# Patient Record
Sex: FEMALE | Race: WHITE | Employment: UNEMPLOYED | URBAN - METROPOLITAN AREA
[De-identification: names, ages, dates, MRNs, and addresses within clinical notes are randomized per-mention and may not be internally consistent; named-entity substitution may affect disease eponyms.]

---

## 2018-08-08 ENCOUNTER — OFFICE VISIT (OUTPATIENT)
Dept: FAMILY MEDICINE CLINIC | Age: 63
End: 2018-08-08

## 2018-08-08 VITALS
WEIGHT: 135 LBS | DIASTOLIC BLOOD PRESSURE: 62 MMHG | HEART RATE: 71 BPM | OXYGEN SATURATION: 96 % | RESPIRATION RATE: 16 BRPM | SYSTOLIC BLOOD PRESSURE: 108 MMHG | BODY MASS INDEX: 24.84 KG/M2 | HEIGHT: 62 IN | TEMPERATURE: 98.6 F

## 2018-08-08 DIAGNOSIS — Z12.11 COLON CANCER SCREENING: ICD-10-CM

## 2018-08-08 DIAGNOSIS — Z78.0 ASYMPTOMATIC MENOPAUSAL STATE: ICD-10-CM

## 2018-08-08 DIAGNOSIS — Z12.39 BREAST CANCER SCREENING: ICD-10-CM

## 2018-08-08 DIAGNOSIS — L98.9 SCALP LESION: ICD-10-CM

## 2018-08-08 DIAGNOSIS — M79.641 PAIN IN BOTH HANDS: Primary | ICD-10-CM

## 2018-08-08 DIAGNOSIS — M79.672 PAIN IN BOTH FEET: ICD-10-CM

## 2018-08-08 DIAGNOSIS — Z13.820 SCREENING FOR OSTEOPOROSIS: ICD-10-CM

## 2018-08-08 DIAGNOSIS — M79.642 PAIN IN BOTH HANDS: Primary | ICD-10-CM

## 2018-08-08 DIAGNOSIS — M79.671 PAIN IN BOTH FEET: ICD-10-CM

## 2018-08-08 NOTE — PROGRESS NOTES
Rm:1    Chief Complaint   Patient presents with    Arthritis     pt presents for Plasma paperwork to be completed that states she has arthritis    Hair/Scalp Problem     pt states she has spots in her hair that come and go     Depression Screening:  PHQ over the last two weeks 8/8/2018   Little interest or pleasure in doing things Not at all   Feeling down, depressed, irritable, or hopeless Not at all   Total Score PHQ 2 0       Learning Assessment:  Learning Assessment 8/8/2018   PRIMARY LEARNER Patient   HIGHEST LEVEL OF EDUCATION - PRIMARY LEARNER  GRADUATED HIGH SCHOOL OR GED   PRIMARY LANGUAGE ENGLISH   LEARNER PREFERENCE PRIMARY DEMONSTRATION   ANSWERED BY patient   RELATIONSHIP SELF       Abuse Screening:  No flowsheet data found. Health Maintenance reviewed and discussed per provider: yes     Coordination of Care:    1. Have you been to the ER, urgent care clinic since your last visit? Hospitalized since your last visit? no    2. Have you seen or consulted any other health care providers outside of the [address] [place] [address] since your last visit? Include any pap smears or colon screening.  no

## 2018-08-08 NOTE — PROGRESS NOTES
HISTORY OF PRESENT ILLNESS  Davy Sexton is a 70 y.o. female. HPI Comments: Ms. Lázaro Gunn is here to establish care and to try to get some paperwork filled out. She went to donate plasma recently and put on the form that she had arthritis and osteoporosis. Apparently there are some types of arthritis that disqualify people from donating, so they need to know what it is. She has painful hands, especially the DIP joints. She also has bunion deformities of both feet. She says she had a bone density study 4-5 years ago and she thinks they told her that she had osteoporosis. Finally, she's had scalp lesions on and off for years, and they are often painful. Somebody prescribed one time, she can't remember what it was, but it didn't help. She got Medicare on Jan 1 and is overdue for most  items. I will go ahead and start on them now, and have her schedule a IPPE wellness visit. Arthritis   Pertinent negatives include no chest pain, no abdominal pain, no headaches and no shortness of breath. Hair/Scalp Problem   Pertinent negatives include no chest pain, no abdominal pain, no headaches and no shortness of breath. Review of Systems   Constitutional: Negative for chills and fever. HENT: Negative for hearing loss and sore throat. Eyes: Negative for blurred vision and double vision. Respiratory: Negative for cough and shortness of breath. Cardiovascular: Negative for chest pain and palpitations. Gastrointestinal: Negative for abdominal pain, nausea and vomiting. Genitourinary: Negative for dysuria and urgency. Musculoskeletal: Positive for joint pain (hands and feet). Skin: Positive for rash (scalp). Neurological: Negative for headaches. Physical Exam   Constitutional: Vital signs are normal. She appears well-developed and well-nourished.    HENT:   Right Ear: Tympanic membrane and ear canal normal.   Left Ear: Tympanic membrane and ear canal normal.   Nose: Nose normal. Mouth/Throat: Uvula is midline, oropharynx is clear and moist and mucous membranes are normal.   Eyes: Pupils are equal, round, and reactive to light. Cardiovascular: Normal rate and regular rhythm. Pulmonary/Chest: Effort normal and breath sounds normal. No respiratory distress. She has no wheezes. Musculoskeletal:   Arthritic changes DIPs   Skin: No rash noted. Red lesions in scalp, non-tender. Nursing note and vitals reviewed. ASSESSMENT and PLAN    ICD-10-CM ICD-9-CM    1. Pain in both hands M79.641 729.5 XR HAND LT AP/LAT    M79.642  XR HAND RT AP/LAT   2. Scalp lesion L98.9 709.9 REFERRAL TO DERMATOLOGY   3. Pain in both feet M79.671 729.5     M79.672     4. Screening for osteoporosis Z13.820 V82.81 DEXA BONE DENSITY STUDY AXIAL   5. Asymptomatic menopausal state Z78.0 V49.81 DEXA BONE DENSITY STUDY AXIAL   6. Breast cancer screening Z12.31 V76.10 [facility] MAMMO BI SCREENING INCL CAD   7.  Colon cancer screening Z12.11 V76.51 REFERRAL TO COLON AND RECTAL SURGERY

## 2018-08-08 NOTE — MR AVS SNAPSHOT
[address] [place] [address] 83 [id number] 
[phone] Patient: Donal Arellano MRN: [id number] OTIS:[id number] Visit Information Date & Time Provider Department Dept. Phone Encounter #  
 8/8/2018  8:30 AM Jovanni Hughes  [facility] [phone] [id number] Upcoming Health Maintenance Date Due Hepatitis C Screening 1955 DTaP/Tdap/Td series (1 - Tdap) 1/24/1974 BREAST CANCER SCRN MAMMOGRAM 1/24/2003 FOBT Q 1 YEAR AGE 50-75 1/24/2003 ZOSTER VACCINE AGE 60> 11/24/2012 GLAUCOMA SCREENING Q2Y 1/24/2018 Bone Densitometry (Dexa) Screening 1/24/2018 Pneumococcal 65+ Low/Medium Risk (1 of 2 - PCV13) 1/24/2018 Influenza Age 5 to Adult 8/1/2018 Allergies as of 8/8/2018  Review Complete On: 8/8/2018 By: Jovanni Hughes MD  
 No Known Allergies Current Immunizations  Never Reviewed No immunizations on file. Not reviewed this visit You Were Diagnosed With   
  
 Codes Comments Pain in both hands    -  Primary ICD-10-CM: M79.641, P80.207 ICD-9-CM: 729.5 Scalp lesion     ICD-10-CM: L98.9 ICD-9-CM: 709.9 Pain in both feet     ICD-10-CM: M79.671, Y81.678 ICD-9-CM: 729.5 Screening for osteoporosis     ICD-10-CM: Z13.820 ICD-9-CM: V82.81 Asymptomatic menopausal state     ICD-10-CM: Z78.0 ICD-9-CM: V49.81 Breast cancer screening     ICD-10-CM: Z12.31 
ICD-9-CM: V76.10 Colon cancer screening     ICD-10-CM: Z12.11 ICD-9-CM: V76.51 Vitals BP Pulse Temp Resp Height(growth percentile) Weight(growth percentile) 108/62 (BP 1 Location: Right arm, BP Patient Position: Sitting) 71 98.6 °F (37 °C) (Oral) 16 5' 2\" (1.575 m) 135 lb (61.2 kg) SpO2 BMI OB Status Smoking Status 96% 24.69 kg/m2 Menopause Never Smoker Vitals History BMI and BSA Data Body Mass Index Body Surface Area  
 24.69 kg/m 2 1.64 m 2 Preferred Pharmacy Pharmacy Name Phone [facility] [facility], [address] [place] [address] [phone] Your Updated Medication List  
  
Notice  As of 8/8/2018  9:22 AM  
 You have not been prescribed any medications. We Performed the Following REFERRAL TO COLON AND RECTAL SURGERY [REF17 Custom] REFERRAL TO DERMATOLOGY [REF19 Custom] To-Do List   
 08/08/2018 Imaging:  VALARIE MAMMO BI SCREENING INCL CAD   
  
 08/09/2018 Imaging:  XR HAND LT AP/LAT   
  
 08/09/2018 Imaging:  XR HAND RT AP/LAT   
  
 08/11/2018 Imaging:  DEXA BONE DENSITY STUDY AXIAL Referral Information Referral ID Referred By Referred To  
  
 [id number] GOLDEN 6 [facility] [place] [address] Philip Beltran [address] Phone: [phone] Fax: [phone] Visits Status Start Date End Date 1 New Request 8/8/18 8/8/19 If your referral has a status of pending review or denied, additional information will be sent to support the outcome of this decision. Referral ID Referred By Referred To  
 [id number] GOLDEN 200  [place] [facility], [place]  
   [address] [place] [address] [address] [place] [address] Phone: [phone] Fax: [phone] Visits Status Start Date End Date 1 New Request 8/8/18 8/8/19 If your referral has a status of pending review or denied, additional information will be sent to support the outcome of this decision. Patient Instructions I have ordered a bone density study and xrays of both hands, that will give me an idea about how to fill out that form. I have ordered a mammogram and a consultation about a colonoscopy. They will contact you. I have referred you to a dermatologist for your scalp. They will contact you. Schedule a \"Welcome to Medicare\" exam. At that point we address your Health Maintenance, and other screenings.  We don't address any routine medical problems at that time (although I will be able to fill out those forms) Introducing [facility]! Romayne Duster introduces [employer] patient portal. Now you can access parts of your medical record, email your doctor's office, and request medication refills online. 1. In your internet browser, go to [email or web address] 2. Click on the First Time User? Click Here link in the Sign In box. You will see the New Member Sign Up page. 3. Enter your [employer] Access Code exactly as it appears below. You will not need to use this code after youve completed the sign-up process. If you do not sign up before the expiration date, you must request a new code. · [employer] Access Code: [email or web address] Expires: 11/6/2018  9:22 AM 
 
4. Enter the last four digits of your Social Security Number (xxxx) and Date of Birth (mm/dd/yyyy) as indicated and click Submit. You will be taken to the next sign-up page. 5. Create a [employer] ID. This will be your [employer] login ID and cannot be changed, so think of one that is secure and easy to remember. 6. Create a [employer] password. You can change your password at any time. 7. Enter your Password Reset Question and Answer. This can be used at a later time if you forget your password. 8. Enter your e-mail address. You will receive e-mail notification when new information is available in [address] 9. Click Sign Up. You can now view and download portions of your medical record. 10. Click the Download Summary menu link to download a portable copy of your medical information. If you have questions, please visit the Frequently Asked Questions section of the [employer] website. Remember, [employer] is NOT to be used for urgent needs. For medical emergencies, dial 911. Now available from your iPhone and Android! Please provide this summary of care documentation to your next provider. If you have any questions after today's visit, please call [phone].

## 2018-08-08 NOTE — PATIENT INSTRUCTIONS
I have ordered a bone density study and xrays of both hands, that will give me an idea about how to fill out that form. I have ordered a mammogram and a consultation about a colonoscopy. They will contact you. I have referred you to a dermatologist for your scalp. They will contact you. Schedule a \"Welcome to Medicare\" exam. At that point we address your Health Maintenance, and other screenings.  We don't address any routine medical problems at that time (although I will be able to fill out those forms)

## 2018-08-30 ENCOUNTER — HOSPITAL ENCOUNTER (OUTPATIENT)
Dept: MAMMOGRAPHY | Age: 63
Discharge: HOME | End: 2018-08-30
Attending: FAMILY MEDICINE
Payer: MEDICARE

## 2018-08-30 ENCOUNTER — HOSPITAL ENCOUNTER (OUTPATIENT)
Dept: GENERAL RADIOLOGY | Age: 63
Discharge: HOME | End: 2018-08-30
Attending: FAMILY MEDICINE
Payer: MEDICARE

## 2018-08-30 DIAGNOSIS — Z12.39 BREAST CANCER SCREENING: ICD-10-CM

## 2018-08-30 DIAGNOSIS — M79.641 PAIN IN BOTH HANDS: ICD-10-CM

## 2018-08-30 DIAGNOSIS — M79.642 PAIN IN BOTH HANDS: ICD-10-CM

## 2018-08-30 DIAGNOSIS — Z78.0 ASYMPTOMATIC MENOPAUSAL STATE: ICD-10-CM

## 2018-08-30 DIAGNOSIS — Z13.820 SCREENING FOR OSTEOPOROSIS: ICD-10-CM

## 2018-08-30 PROCEDURE — 73120 X-RAY EXAM OF HAND: CPT

## 2018-08-30 PROCEDURE — 77080 DXA BONE DENSITY AXIAL: CPT

## 2018-08-30 PROCEDURE — 77067 SCR MAMMO BI INCL CAD: CPT

## 2018-08-31 ENCOUNTER — TELEPHONE (OUTPATIENT)
Dept: FAMILY MEDICINE CLINIC | Age: 63
End: 2018-08-31

## 2018-08-31 RX ORDER — ALENDRONATE SODIUM 70 MG/1
70 TABLET ORAL
Qty: 12 TAB | Refills: 1 | Status: SHIPPED | OUTPATIENT
Start: 2018-08-31

## 2018-08-31 NOTE — TELEPHONE ENCOUNTER
----- Message from Gerber De Anda LPN sent at  12:19 PM EDT -----  Call made to pt, both name and  verified. Pt was advised of results, she verbalized understanding and is in agreement with treatment plan.  Pt also stated she would bring plasma form back to office to have signed

## 2018-08-31 NOTE — PROGRESS NOTES
Call made to pt, both name and  verified. Pt was advised of results, she verbalized understanding and is in agreement with treatment plan.  Pt also stated she would bring plasma form back to office to have signed

## 2018-08-31 NOTE — PROGRESS NOTES
Advise pt that she does have mild arthritis in her hands. More importantly, she has osteoporosis and needs to be on medication.  I would recommend a once weekly Fosamax She should also take a vitamin for \"bone health\", one that contains calcium and Vitamin D